# Patient Record
Sex: MALE | Race: ASIAN
[De-identification: names, ages, dates, MRNs, and addresses within clinical notes are randomized per-mention and may not be internally consistent; named-entity substitution may affect disease eponyms.]

---

## 2021-08-01 ENCOUNTER — HOSPITAL ENCOUNTER (EMERGENCY)
Dept: HOSPITAL 4 - SED | Age: 26
Discharge: HOME | End: 2021-08-01
Payer: SELF-PAY

## 2021-08-01 VITALS — WEIGHT: 155 LBS | HEIGHT: 69 IN | BODY MASS INDEX: 22.96 KG/M2 | SYSTOLIC BLOOD PRESSURE: 114 MMHG

## 2021-08-01 DIAGNOSIS — U07.1: Primary | ICD-10-CM

## 2021-08-01 DIAGNOSIS — Z79.899: ICD-10-CM

## 2021-08-01 NOTE — NUR
Patient given written and verbal discharge instructions and verbalizes 
understanding.  ER MD discussed with patient the results and treatment 
provided. Patient in stable condition. ID arm band removed. 

Rx of PREDNISONE given. Patient educated on pain management and to follow up 
with PMD. Pain Scale 0/10.

Opportunity for questions provided and answered. Medication side effect fact 
sheet provided.

## 2021-08-01 NOTE — NUR
PT STATES HE LOST TASTE AND SMELL, LIVES WITH MOTHER WHO IS COVID +, PT STATES 
HE HAS A COUGH. SPEAKING FULL SENTENCES, NO DISTRESS.

## 2022-07-27 ENCOUNTER — HOSPITAL ENCOUNTER (OUTPATIENT)
Dept: HOSPITAL 26 - MOR | Age: 27
Discharge: HOME | End: 2022-07-27
Attending: INTERNAL MEDICINE
Payer: COMMERCIAL

## 2022-07-27 VITALS — BODY MASS INDEX: 26.97 KG/M2 | WEIGHT: 180 LBS | HEIGHT: 68.5 IN

## 2022-07-27 DIAGNOSIS — K21.00: ICD-10-CM

## 2022-07-27 DIAGNOSIS — R10.13: Primary | ICD-10-CM

## 2022-07-27 DIAGNOSIS — K27.9: ICD-10-CM

## 2022-07-27 DIAGNOSIS — K29.70: ICD-10-CM

## 2022-07-27 DIAGNOSIS — K31.7: ICD-10-CM

## 2022-07-27 DIAGNOSIS — Z20.822: ICD-10-CM

## 2022-07-27 DIAGNOSIS — Z79.899: ICD-10-CM

## 2022-07-27 PROCEDURE — 88305 TISSUE EXAM BY PATHOLOGIST: CPT

## 2022-07-27 PROCEDURE — 88342 IMHCHEM/IMCYTCHM 1ST ANTB: CPT

## 2022-07-27 PROCEDURE — 88313 SPECIAL STAINS GROUP 2: CPT

## 2022-07-27 PROCEDURE — 88312 SPECIAL STAINS GROUP 1: CPT

## 2022-07-27 PROCEDURE — 43251 EGD REMOVE LESION SNARE: CPT

## 2022-07-27 PROCEDURE — 43239 EGD BIOPSY SINGLE/MULTIPLE: CPT

## 2022-07-27 PROCEDURE — 87426 SARSCOV CORONAVIRUS AG IA: CPT

## 2022-10-05 ENCOUNTER — HOSPITAL ENCOUNTER (OUTPATIENT)
Dept: HOSPITAL 26 - MMU | Age: 27
Discharge: HOME | End: 2022-10-05
Attending: INTERNAL MEDICINE
Payer: COMMERCIAL

## 2022-10-05 VITALS — WEIGHT: 170 LBS | HEIGHT: 68.5 IN | BODY MASS INDEX: 25.47 KG/M2

## 2022-10-05 DIAGNOSIS — Z87.11: ICD-10-CM

## 2022-10-05 DIAGNOSIS — Z79.899: ICD-10-CM

## 2022-10-05 DIAGNOSIS — Z20.822: ICD-10-CM

## 2022-10-05 DIAGNOSIS — K31.7: ICD-10-CM

## 2022-10-05 DIAGNOSIS — R10.13: Primary | ICD-10-CM

## 2022-10-05 DIAGNOSIS — K20.90: ICD-10-CM

## 2022-10-05 DIAGNOSIS — K29.70: ICD-10-CM

## 2022-10-05 PROCEDURE — 87426 SARSCOV CORONAVIRUS AG IA: CPT

## 2022-10-05 PROCEDURE — 43239 EGD BIOPSY SINGLE/MULTIPLE: CPT
